# Patient Record
Sex: MALE | Race: OTHER | HISPANIC OR LATINO | Employment: FULL TIME | URBAN - METROPOLITAN AREA
[De-identification: names, ages, dates, MRNs, and addresses within clinical notes are randomized per-mention and may not be internally consistent; named-entity substitution may affect disease eponyms.]

---

## 2019-01-19 ENCOUNTER — HOSPITAL ENCOUNTER (EMERGENCY)
Age: 39
Discharge: HOME | End: 2019-01-19
Attending: EMERGENCY MEDICINE
Payer: COMMERCIAL

## 2019-01-19 VITALS
DIASTOLIC BLOOD PRESSURE: 75 MMHG | OXYGEN SATURATION: 100 % | BODY MASS INDEX: 28.68 KG/M2 | HEART RATE: 61 BPM | WEIGHT: 168 LBS | SYSTOLIC BLOOD PRESSURE: 137 MMHG | RESPIRATION RATE: 20 BRPM | TEMPERATURE: 99.2 F | HEIGHT: 64 IN

## 2019-01-19 DIAGNOSIS — R05.9 COUGH: ICD-10-CM

## 2019-01-19 DIAGNOSIS — J34.89 RHINORRHEA: ICD-10-CM

## 2019-01-19 DIAGNOSIS — R52 GENERALIZED BODY ACHES: Primary | ICD-10-CM

## 2019-01-19 DIAGNOSIS — R51.9 NONINTRACTABLE HEADACHE, UNSPECIFIED CHRONICITY PATTERN, UNSPECIFIED HEADACHE TYPE: ICD-10-CM

## 2019-01-19 DIAGNOSIS — R11.10 VOMITING IN ADULT: ICD-10-CM

## 2019-01-19 DIAGNOSIS — R68.89 FLU-LIKE SYMPTOMS: ICD-10-CM

## 2019-01-19 PROCEDURE — 99283 EMERGENCY DEPT VISIT LOW MDM: CPT

## 2019-01-19 PROCEDURE — 74011250637 HC RX REV CODE- 250/637: Performed by: PHYSICIAN ASSISTANT

## 2019-01-19 RX ORDER — PROMETHAZINE HYDROCHLORIDE 25 MG/1
25 TABLET ORAL
Qty: 12 TAB | Refills: 0 | Status: SHIPPED | OUTPATIENT
Start: 2019-01-19 | End: 2021-04-09

## 2019-01-19 RX ORDER — IBUPROFEN 800 MG/1
800 TABLET ORAL
Status: COMPLETED | OUTPATIENT
Start: 2019-01-19 | End: 2019-01-19

## 2019-01-19 RX ORDER — IBUPROFEN 800 MG/1
800 TABLET ORAL
Qty: 20 TAB | Refills: 0 | Status: SHIPPED | OUTPATIENT
Start: 2019-01-19 | End: 2019-01-26

## 2019-01-19 RX ADMIN — IBUPROFEN 800 MG: 800 TABLET ORAL at 10:15

## 2019-01-19 NOTE — ED TRIAGE NOTES
Generalized body aches, fever and head congestion with a headache. 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

## 2019-01-19 NOTE — DISCHARGE INSTRUCTIONS
Rest, push clear liquids, salt water nose sprays, salt water gargles. Motrin and tylenol for pain and fever. Tos: Instrucciones de cuidado - [ Cough: Care Instructions ]  Instrucciones de cuidado    La tos es [place] de jauregui cuerpo a algo que molesta en la garganta o las vías respiratorias. La tos puede ser provocada por muchas cosas. Usted podría toser debido a un resfriado o manjit gripe, manjit bronquitis o el asma. Fumar, el goteo posnasal, las alergias y el ácido estomacal que regresa a jauregui garganta también pueden causar tos. La tos es un síntoma, no manjit enfermedad. En la [employer], la tos cesa cuando desaparece la causa, fahad un resfriado. Puede conrad algunas medidas en jauregui hogar para toser menos y sentirse mejor. La atención de seguimiento es manjit parte clave de jauregui tratamiento y seguridad. Asegúrese de hacer y acudir a todas las citas, y llame a jauregui médico si está teniendo problemas. También es manjit buena idea saber los resultados de tatiana exámenes y mantener amnjit lista de los medicamentos que germain. ¿Cómo puede cuidarse en el hogar? · Nellie abundante agua y otros líquidos. Tazlina ayuda a [employer] mucosidad sea menos espesa y [place] la garganta seca o adolorida. La miel o el jugo de austyn en [place] o té podrían aliviar manjit tos seca. · [employer] medicamentos para la tos según las indicaciones de jauregui médico.  · Eleve la megan sobre almohadas para ayudarle a respirar y aliviar la tos seca. · Pruebe pastillas para la tos para aliviar la garganta seca o adolorida. Las pastillas para la tos no detienen la tos. Las pastillas para la tos medicinales saborizadas no son mejores que las pastillas con sabor a cuate o los caramelos duros. · No fume. Evite el humo de tabaco ambiental. Si necesita ayuda para dejar de fumar, hable con jauregui médico sobre programas y medicamentos para dejar de fumar. Estos pueden aumentar tatiana probabilidades de dejar el hábito para siempre. ¿Cuándo debe pedir ayuda?   Llame al 911 en cualquier momento que considere que necesita atención de [place]. Por ejemplo, llame si:    · Tiene graves dificultades para respirar.    Llame a jauregui médico ahora mismo o busque atención médica inmediata si:    · Tose philly.     · Tiene nuevas dificultades para respirar o estas empeoran.     · Tiene fiebre nueva o más mica.     · Tiene un salpullido nuevo.    Preste especial atención a los cambios en jauregui pillo y asegúrese de comunicarse con jauregui médico si:    · Jauregui tos es más profunda o más frecuente que antes, especialmente si nota más mucosidad o un cambio en el color de la mucosidad.     · Tiene nuevos síntomas, fahad dolor de garganta, dolor de oídos o dolor en los senos paranasales.     · No mejora fahad se esperaba. ¿Dónde puede encontrar más información en inglés? Jeri Tinoco a http://sienna-[email or web address].info/. Escriba D279 en la búsqueda para aprender más acerca de \"Tos: Instrucciones de cuidado - [ Cough: Care Instructions ]. \"  Revisado: 5 septiembre, 2018  Versión del contenido: 11.9  © 0378-3194 Healthwise, Incorporated. Las instrucciones de cuidado fueron adaptadas bajo licencia por Good [employer] Connections (which disclaims liability or warranty for this information). Si usted tiene [place] afección médica o sobre estas instrucciones, siempre pregunte a jauregui profesional de pillo. Healthwise, Incorporated niega toda garantía o responsabilidad por jauregui uso de esta información. Patient Education        Dolor de Verneta Stair: Instrucciones de cuidado - [ Headache: Care Instructions ]  Instrucciones de cuidado    Los lamont de megan tienen muchas causas posibles. La mayoría de los lamont de megan no son señal de un problema más florina y mejoran por sí solos. El tratamiento en el hogar podría ayudarlo a sentirse mejor con Lien Dieter. El médico lo newman revisado minuciosamente, pallavi puede desarrollar problemas más tarde.  Si nota algún problema o síntomas, busque tratamiento médico inmediatamente. La atención de seguimiento es manjit parte clave de jauregui tratamiento y seguridad. Asegúrese de hacer y acudir a todas las citas, y llame a jauregui médico si está teniendo problemas. También es manjit buena idea saber los resultados de tatiana exámenes y mantener manjit lista de los medicamentos que germain. ¿Cómo puede cuidarse en el hogar? · No conduzca si ha tomado analgésicos (medicamentos para el dolor) recetados. · Descanse en un cuarto tranquilo y oscuro hasta que desaparezca el dolor de [place]. Cierre los ojos y trate de relajarse o dormirse. No tatianna la televisión ni francesca. · Colóquese un paño frío y húmedo o [place] compresa fría sobre la steven adolorida de 10 a 21 minutos cada vez. Póngase un paño lopez entre la compresa fría y la piel. · Utilice manjit toalla húmeda tibia o manjit almohadilla térmica ajustada a baja temperatura para relajar los músculos tensos del andrés y los hombros.  · [place] a alguien que le farida masajes suaves en el andrés y los hombros.  · [place] los analgésicos exactamente fahad le fueron indicados. ? Si el médico le recetó un analgésico, tómelo según las indicaciones. ? Si no está tomando un analgésico recetado, pregúntele a jauregui médico si puede conrad ronak de [employer]. · Tenga cuidado de no conrad analgésicos con mayor frecuencia que la permitida en las indicaciones porque los lamont de megan podrían empeorar o aparecer con mayor frecuencia manjit vez que el medicamento pierda jauregui [place]. · Preste atención a los nuevos síntomas que aparecen con el dolor de [place], [place], debilidad o entumecimiento, cambios en la visión o confusión. Podrían ser señales de un problema más grave. Para prevenir los lamont de megan  · [employer] un diario de tatiana lamont de megan para que pueda averiguar qué los desencadena. Evitar los desencadenantes podría ayudar a prevenir los lamont de [place]. Anote cuándo empieza cada dolor de [place], cuánto dura y cómo es el dolor (palpitante, brandon, punzante o sordo). Anote cualquier otro síntoma que haya tenido con el dolor de [place], [place] náuseas, destellos de cristina o ADALBERTO, o sensibilidad a la cristina brillante o a los ruidos rupert. Anote si el dolor de megan ocurrió cerca de jauregui menstruación. Enumere todos los factores que pudieran tamra desencadenado el dolor de [place], fahad ciertos alimentos (chocolate, queso, vino) u olores, humo, luces brillantes, estrés o falta de sueño. · Encuentre maneras saludables de [facility]. Los lamont de [place] son más comunes sean o isai después de un momento estresante. Tómese un tiempo para relajarse antes y después de hacer algo que le haya causado un dolor de megan en el pasado. · Trate de mantener liliam músculos relajados mediante manjit buena postura. Revise si tiene [employer] de la Danielle, la alma, el andrés y los hombros y trate de relajarlos. Cuando se siente en un escritorio, cambie de posición con frecuencia y estírese por 27 segundos cada hora. · Baldomero suficiente ejercicio y duerma bastante. · Coma en forma regular y delmy. Largos períodos sin comer pueden provocar un dolor de megan. · Regálese un masaje. Algunas personas encuentran que los masajes hechos con regularidad son [place] para aliviar la tensión. · Limite la cafeína. No rossy demasiado café, té ni sodas. Amee no deje de consumir cafeína de repente, porque eso también puede provocarle lamont de [place]. · Reduzca la tensión en los ojos a causa de la computadora parpadeando con frecuencia y apartando la mirada de la pantalla a menudo. Asegúrese de tener lentes adecuados y de que jauregui monitor esté colocado de manera correcta, fahad a un brazo de distancia. · Busque ayuda si tiene depresión o ansiedad. Liliam lamont de [place] podrían relacionarse con estas afecciones. El tratamiento puede prevenir los lamont de [place] y ayudar con los síntomas de ansiedad o depresión. ¿Cuándo debe pedir ayuda?   Llame al 911 en cualquier momento que chucke que puede necesitar atención de emergencia. Por ejemplo, llame si:    · Tiene señales de un ataque cerebral. Estas pueden incluir:  ? Parálisis, entumecimiento o debilidad repentinos en la alma, el brazo o la pierna, sobre todo si ocurre en un solo lado del cuerpo. ? Cambios repentinos en la visión. ? Dificultades repentinas para hablar. ? Confusión repentina o dificultad para comprender frases sencillas. ? Problemas repentinos para caminar o mantener el equilibrio. ? Dolor de [place] intenso y repentino, distinto de los lamont de megan anteriores.    Llame a jauregui médico ahora mismo o busque atención médica inmediata si:    · Tiene un dolor de Matt Caper o peor.     · Jauregui dolor de megan empeora mucho. ¿Dónde puede encontrar más información en inglés? Julia Marker a http://sienna-[email or web address].info/. Escriba L320 en la búsqueda para aprender más acerca de \"Dolor de megan: Instrucciones de cuidado - [ Headache: Care Instructions ]. \"  Revisado: 3 danna, 2018  Versión del contenido: 11.9  © 3955-5538 Healthwise, Incorporated. Las instrucciones de cuidado fueron adaptadas bajo licencia por Good Help Connections (which disclaims liability or warranty for this information). Si usted tiene [place] afección médica o sobre estas instrucciones, siempre pregunte a jauregui profesional de pillo. Healthwise, Incorporated niega toda garantía o responsabilidad por jauregui uso de esta información. Patient Education        Náuseas y vómito: Instrucciones de cuidado - [ Nausea and Vomiting: Care Instructions ]  Instrucciones de cuidado    Cuando tiene náuseas, podría sentirse débil y sudoroso y notar mucha saliva en jauregui boca. Las náuseas suelen [employer]. La mayoría de las veces no hay que preocuparse por las náuseas y [facility], pallavi estos pueden ser signos de [facility]. Dos causas comunes de náuseas y vómito son la gastroenteritis viral y la intoxicación por alimentos.  Las náuseas y [facility] vómito por gastroenteritis viral, por lo general, empiezan a mejorar en unas 24 horas. Las náuseas y el vómito debido a intoxicación por alimentos podrían durar de 12 a 48 horas. El médico lo ha revisado minuciosamente, pallavi puede desarrollar problemas más tarde. Si nota algún problema o síntomas nuevos, busque tratamiento médico inmediatamente. La atención de seguimiento es manjit parte clave de jauregui tratamiento y seguridad. Asegúrese de hacer y acudir a todas las citas, y llame a jauregui médico si está teniendo problemas. También es manjit buena idea saber los resultados de tatiana exámenes y mantener manjit lista de los medicamentos que germain. ¿Cómo puede cuidarse en el hogar? · Para prevenir la deshidratación, rossy abundantes líquidos, suficientes para que jauregui orina sea de color amarillo gloria o amilcar fahad el agua. Opte por beber agua y otros líquidos maliha sin cafeína hasta que se sienta mejor. Si tiene manjit enfermedad del riñón, del corazón o del hígado y tiene que [place]'s líquidos, hable con jauregui médico antes de aumentar jauregui consumo. · Permanezca en la cama hasta que se sienta mejor. · Cuando pueda comer, empiece a consumir sopas claras (caldos), alimentos suaves y líquidos hasta que todos los síntomas hayan desaparecido por un período de 12 a 48 horas. Otras elecciones buenas incluyen pan giovana seco, galletas saladas, cereal cocido y postre de gelatina, fahad Jell-O.  ¿Cuándo debe pedir ayuda? Llame al 911 toda vez que piense que puede necesitar atención de emergencia. Por ejemplo, llame si:    · Se desmayó (perdió el conocimiento).    Llame a jauregui médico ahora mismo o busque atención médica inmediata si:    · Tiene síntomas de deshidratación, tales fahad:  ? Ojos secos y boca seca. ? Orina solo poca cantidad de color oscuro. ?  Tiene más sed de lo normal.     · Tiene dolor abdominal nuevo o que empeora.     · Tiene fiebre nueva o que [place].     · Vomita philly o algo parecido a granos de café molido.    Preste especial atención a los [employer] jauregui pillo y asegúrese de comunicarse con jauregui médico si:    · Tiene náusea y vómito continuos.     · El vómito está empeorando.     · El vómito dura más de 2 días.     · No mejora fahad se esperaba. ¿Dónde puede encontrar más información en inglés? Edna End a http://sienna-dawson.info/. Maria T Gucci H591 en la búsqueda para aprender más acerca de \"Náuseas y vómito: Instrucciones de cuidado - [ Nausea and Vomiting: Care Instructions ]. \"  Revisado: 23 septiembre, 2018  Versión del contenido: 11.9  © 2598-8156 Healthwise, Incorporated. Las instrucciones de cuidado fueron adaptadas bajo licencia por Good [employer] Connections (which disclaims liability or warranty for this information). Si usted tiene [place] afección médica o sobre estas instrucciones, siempre pregunte a jauregui profesional de pillo. Healthwise, Incorporated niega toda garantía o responsabilidad por jauregui uso de esta información. Patient Education        Influenza (gripe): Instrucciones de cuidado - [ Influenza (Flu): Care Instructions ]  Instrucciones de cuidado    La influenza (gripe) es manjit infección de los pulmones y las vías respiratorias. Es causada por el virus de la influenza. Hay diferentes cepas o tipos de virus de la gripe de un año a otro. A diferencia del resfriado común, la gripe se presenta de [place] repentina y [address], tales fahad tos, [place], [place], escalofríos, fatiga y [place], son más intensos. Estos síntomas pueden durar hasta 10 días. Aunque la gripe puede hacerle sentirse muy enfermo, por lo general no causa problemas serios de pillo. Por lo general, todo lo que necesita para los síntomas de la gripe es tratamiento en casa. Amee jauregui médico podría recetarle algún medicamento antiviral para prevenir otros problemas de pillo, fahad la neumonía.  Las personas mayores y quienes tienen un problema de pillo prolongado, fahad manjit enfermedad pulmonar, tienen el mayor riesgo de desarrollar neumonía u otros problemas de pillo. La atención de seguimiento es manjit parte clave de jauregui tratamiento y seguridad. Asegúrese de hacer y acudir a todas las citas, y llame a jauregui médico si está teniendo problemas. También es manjit buena idea saber los resultados de tatiana exámenes y mantener manjit lista de los medicamentos que germain. ¿Cómo puede cuidarse en el hogar? · Descanse bastante. · Nellie abundantes líquidos, suficientes para que jauregui orina sea de color amarillo gloria o transparente fahad el agua. Si tiene manjit enfermedad del riñón, del corazón o del hígado y tiene que L[place]'s líquidos, hable con jauregui médico antes de aumentar jauregui consumo. · Si es necesario, tome un analgésico (medicamento para el dolor) de venta connie, fahad acetaminofén (Tylenol), ibuprofeno (Advil, Motrin) o naproxeno (Aleve), para [employer] fiebre, el dolor de [place] y los lamont musculares. Soraida y siga todas las instrucciones de la [place]. Ninguna persona cecy de 20 años debe conrad aspirina. Ésta ha sido relacionada con el síndrome de [place], manjit enfermedad grave. · No fume. Fumar puede empeorar la gripe. Si necesita ayuda para dejar de fumar, hable con jauregui médico [employer] y medicamentos para dejar de fumar. Éstos pueden aumentar tatiana probabilidades de dejar el hábito para siempre. · Para ayudar a despejar la nariz congestionada, respire aire húmedo de [place] caliente o un lavabo lleno de [place]. · Antes de usar medicamentos para la tos y los resfriados, revise la [place]. Estos medicamentos podrían no ser seguros para los niños pequeños o las personas con ciertos problemas de [place]. · Si le duele la piel alrededor de la nariz y los labios, aplique un poco de vaselina en la steven. · Para aliviar la tos:  ? Nellie líquidos para aliviar la comezón de garganta. ? Chupe pastillas para la tos o caramelos duros comunes. ? [employer] para la tos de venta connie que contenga dextrometorfano para ayudarle a dormir.  Soraida y [id number] [place] todas las instrucciones de la etiqueta. ? Use manjit almohada extra en la noche para levantar más la megan. Kulm podría ayudarlo a descansar si la tos lo mantiene despierto. · [employer] medicamentos recetados exactamente fahad le fueron recetados. Llame a jauregui médico si adelina estar teniendo problemas con jauregui medicamento. Cómo evitar propagar la gripe  · [employer] karuna con regularidad y manténgalas alejadas de jauregui alma. · No vaya a la escuela, al Charley Cresco o a otros lugares públicos hasta que se sienta mejor y jauregui fiebre haya desaparecido por al menos 24 horas. La fiebre debe tamra desaparecido por sí misma, sin la ayuda de medicamentos. · Pida a las personas que viven con usted que hablen con tatiana médicos sobre la prevención de la gripe. Cristhian vez les den algún medicamento antiviral para no contraer jauregui gripe. · Para prevenir tener la gripe en el futuro, hágase poner la vacuna contra la gripe cada otoño. Anime a las personas que viven en jauregui casa a ponerse la vacuna. · Cúbrase la boca al toser o estornudar. ¿Cuándo debe pedir ayuda? Llame al 911 en cualquier momento que considere que necesita atención de emergencia. Por ejemplo, llame si:    · Tiene serias dificultades para respirar.    Llame a jauregui médico ahora mismo o busque atención médica inmediata si:    · Tiene nueva o mayor dificultad para respirar.     · Le parece que está mucho más enfermo.     · Se siente muy somnoliento (con sueño) o confuso.     · Tiene fiebre nueva o más mica.     · Tiene un salpullido nuevo.    Preste especial atención a los cambios en jauregui pillo y asegúrese de comunicarse con jauregui médico si:    · Minetta Bone a mejorar y después empeora otra vez.     · No está mejorando después de 1 semana. ¿Dónde puede encontrar más información en inglés? Gerri Mike a http://sienna-dawson.info/. Roberta Vicente U321 en la búsqueda para aprender más acerca de \"Influenza (gripe): Instrucciones de cuidado - [ Influenza (Flu): Care Instructions ]. \"  Revisado: 5 septiembre, 2018  Versión del contenido: 11.9  © 6276-4919 Healthwise, Incorporated. Las instrucciones de cuidado fueron adaptadas bajo licencia por Good Help Connections (which disclaims liability or warranty for this information). Si usted tiene [place] afección médica o sobre estas instrucciones, siempre pregunte a jauregui profesional de pillo. Healthwise, Incorporated niega toda garantía o responsabilidad por jauregui uso de esta información. Infecciones virales: Instrucciones de cuidado - [ Viral Infections: Care Instructions ]  Instrucciones de cuidado    Usted no se siente delmy, amee no está gloria qué lo está causando. Podría tener manjit infección viral. Los virus provocan muchas enfermedades, fahad el resfriado común, influenza, fiebre, salpullidos, y la diarrea, las náuseas y el vómito que suelen llamarse \"gastroenteritis viral\". Es posible que se pregunte si los medicamentos antibióticos pueden ayudarle a sentirse mejor. Amee los antibióticos tratan únicamente infecciones causadas por bacterias. No funcionan para los virus. La buena noticia es que las infecciones virales generalmente no son graves. La mayoría desaparecen en unos pocos días sin tratamiento médico. Mientras tanto, hay algunas cosas que usted puede hacer para estar más cómodo. La atención de seguimiento es manjit parte clave de jauregui tratamiento y seguridad. Asegúrese de hacer y acudir a todas las citas, y llame a jauregui médico si está teniendo problemas. También es manjit buena idea saber los resultados de tatiana exámenes y mantener manjit lista de los medicamentos que germain. ¿Cómo puede cuidarse en el hogar? · Descanse lo suficiente si se siente agotado. · [place] un analgésico (medicamento para el dolor) de venta connie, fahad acetaminofén (Tylenol), ibuprofeno (Advil, Motrin) o naproxeno (Aleve), si es necesario. Soraida y siga todas las instrucciones de la [place].   · Tenga cuidado cuando tome medicamentos de venta connie para el resfriado o la gripe y Tylenol al mismo tiempo. Muchos de estos medicamentos contienen acetaminofén, o sea, Tylenol. Soraida las etiquetas para asegurarse de que no esté tomando manjit dosis mayor que la recomendada. El exceso de acetaminofén (Tylenol) puede ser dañino. · Nellie abundantes líquidos, suficientes para que jauregui orina sea de color amarillo gloria o amilcar fahad el agua. Si tiene manjit enfermedad del riñón, del corazón o del hígado y tiene que [place]'s líquidos, hable con jauregui médico antes de aumentar jauregui consumo. · Cuando tenga fiebre, no vaya al [facility], a la escuela ni a otros lugares públicos. ¿Cuándo debe pedir ayuda? Llame al 911 en cualquier momento que considere que necesita atención de emergencia. Por ejemplo, llame si:    · Tiene dificultad grave para respirar.     · Se desmayó (perdió el conocimiento).    Llame a jauregui médico ahora mismo o busque atención médica inmediata si:    · Le parece que está mucho más enfermo.     · Tiene fiebre nueva o más mica.     · Tiene philly en las heces.     · Tiene dolor de estómago nuevo o que [facility].     · Tiene un nuevo salpullido.    Preste especial atención a los cambios en jauregui pillo y asegúrese de comunicarse con jauregui médico si:    · Nicholos Blaze a mejorar y luego empeora.     · No mejora fahad se esperaba. ¿Dónde puede encontrar más información en inglés? Shelbie Ferrer a http://sienna-dawson.info/. Alee Jose U043 en la búsqueda para aprender más acerca de \"Infecciones virales: Instrucciones de cuidado - [ Viral Infections: Care Instructions ]. \"  Revisado: 30 julio, 2018  Versión del contenido: 11.9  © 8606-0468 Healthwise, Incorporated. Las instrucciones de cuidado fueron adaptadas bajo licencia por Good Help Connections (which disclaims liability or warranty for this information). Si usted tiene [place] afección médica o sobre estas instrucciones, siempre pregunte a jauregui profesional de pillo.  Healthwise, Incorporated niega toda garantía o responsabilidad por jauregui uso de esta información.

## 2019-01-19 NOTE — LETTER
[address] 
[facility] EMERGENCY DEPT 
[address] [facility] [address] [facility] [address] [place] [address] [id number] [phone] Work/School Note Date: 1/19/2019 To Whom It May concern: 
 
Cris Ashley was seen and treated today in the emergency room by the following provider(s): 
Attending Provider: Bruno Reid MD 
Physician Assistant: CRISTIANA Brito. Cris Ashley may return to work on 1/22/19.  
 
Sincerely, 
 
 
 
 
CRISTIANA Roberto

## 2021-04-09 ENCOUNTER — OFFICE VISIT (OUTPATIENT)
Dept: URGENT CARE | Age: 41
End: 2021-04-09
Payer: COMMERCIAL

## 2021-04-09 VITALS — RESPIRATION RATE: 17 BRPM | TEMPERATURE: 97.8 F | HEART RATE: 64 BPM | OXYGEN SATURATION: 100 %

## 2021-04-09 DIAGNOSIS — Z20.822 EXPOSURE TO COVID-19 VIRUS: Primary | ICD-10-CM

## 2021-04-09 LAB — SARS-COV-2 POC: NEGATIVE

## 2021-04-09 PROCEDURE — 99202 OFFICE O/P NEW SF 15 MIN: CPT | Performed by: NURSE PRACTITIONER

## 2021-04-09 PROCEDURE — 87426 SARSCOV CORONAVIRUS AG IA: CPT | Performed by: NURSE PRACTITIONER

## 2021-04-09 NOTE — PROGRESS NOTES
This patient was seen at [address] [place] [address] Urgent Care while in their vehicle due to COVID-19 pandemic with PPE and focused examination in order to decrease community viral transmission. The patient/guardian gave verbal consent to treat. Heather Brito is a 47 y.o. male who presents for COVID-19 testing. Was exposed to COVID-19 by co-worker this past week. Denies any symptoms such as cough, SOB, chest tightness, congestion, ST, HA, n/v/d, fever etc. No known exposure to COVID or sick contacts. No other complaints or concerns at this time. PMH: None. Non-smoker. History reviewed. No pertinent past medical history. History reviewed. No pertinent surgical history. History reviewed. No pertinent family history.      Social History     Socioeconomic History    Marital status:      Spouse name: Not on file    Number of children: Not on file    Years of education: Not on file    Highest education level: Not on file   Occupational History    Not on file   Social Needs    Financial resource strain: Not on file    Food insecurity     Worry: Not on file     Inability: Not on file    Transportation needs     Medical: Not on file     Non-medical: Not on file   Tobacco Use    Smoking status: Never Smoker    Smokeless tobacco: Never Used   Substance and Sexual Activity    Alcohol use: Not on file    Drug use: Not on file    Sexual activity: Not on file   Lifestyle    Physical activity     Days per week: Not on file     Minutes per session: Not on file    Stress: Not on file   Relationships    Social connections     Talks on phone: Not on file     Gets together: Not on file     Attends [employer] service: Not on file     Active member of club or organization: Not on file     Attends meetings of clubs or organizations: Not on file     Relationship status: Not on file    Intimate partner violence     Fear of current or ex partner: Not on file     Emotionally abused: Not on file Physically abused: Not on file     Forced sexual activity: Not on file   Other Topics Concern    Not on file   Social History Narrative    Not on file                ALLERGIES: Patient has no known allergies. Review of Systems   Constitutional: Negative for activity change, appetite change, chills, diaphoresis, fatigue and fever. HENT: Negative for congestion, ear pain, postnasal drip, rhinorrhea, sinus pressure, sinus pain and sore throat. Respiratory: Negative for cough, chest tightness, shortness of breath and wheezing. Cardiovascular: Negative for chest pain. Gastrointestinal: Negative for abdominal pain, diarrhea, nausea and vomiting. Musculoskeletal: Negative for myalgias. Skin: Negative for rash. Neurological: Negative for dizziness, light-headedness and headaches. Vitals:    04/09/21 0906   Pulse: 64   Resp: 17   Temp: 97.8 °F (36.6 °C)   SpO2: 100%       Physical Exam  Vitals signs and nursing note reviewed. Constitutional:       General: He is not in acute distress. Appearance: Normal appearance. He is not ill-appearing. HENT:      Head: Normocephalic and atraumatic. Eyes:      Conjunctiva/sclera: Conjunctivae normal.      Pupils: Pupils are equal, round, and reactive to light. Neck:      Musculoskeletal: Normal range of motion and neck supple. Cardiovascular:      Rate and Rhythm: Normal rate. Pulmonary:      Effort: Pulmonary effort is normal.   Musculoskeletal: Normal range of motion. Skin:     General: Skin is warm and dry. Findings: No rash. Neurological:      Mental Status: He is alert and oriented to person, place, and time. Psychiatric:         Mood and Affect: Mood normal.         Thought Content: Thought content normal.         MDM    Procedures      ICD-10-CM ICD-9-CM   1.  Exposure to COVID-19 virus  Z20.822 V01.79       Orders Placed This Encounter    NOVEL CORONAVIRUS (COVID-19) ([facility] Default)     Scheduling Instructions:      1) Due to current limited availability of the COVID-19 PCR test, tests will be prioritized and may not be completed.              2) Order only if the test result will change clinical management or necessary for a return to mission-critical employment decision.              3) Print and instruct patient to adhere to CDC home isolation program. (Link Above)              4) Set up or refer patient for a monitoring program.              5) Have patient sign up for and leverage MyChart (if not previously done). Order Specific Question:   Is this test for diagnosis or screening? Answer:   Screening     Order Specific Question:   Symptomatic for COVID-19 as defined by CDC? Answer:   No     Order Specific Question:   Hospitalized for COVID-19? Answer:   No     Order Specific Question:   Admitted to ICU for COVID-19? Answer:   No     Order Specific Question:   Employed in healthcare setting? Answer:   Unknown     Order Specific Question:   Resident in a congregate (group) care setting? Answer:   Unknown     Order Specific Question:   Previously tested for COVID-19? Answer:   Unknown    AMB POC SARS-COV-2     Order Specific Question:   Is this test for diagnosis or screening? Answer:   Screening     Order Specific Question:   Symptomatic for COVID-19 as defined by CDC? Answer:   No     Order Specific Question:   Hospitalized for COVID-19? Answer:   No     Order Specific Question:   Admitted to ICU for COVID-19? Answer:   No     Order Specific Question:   Employed in healthcare setting? Answer:   Unknown     Order Specific Question:   Resident in a congregate (group) care setting? Answer:   Unknown     Order Specific Question:   Previously tested for COVID-19?      Answer:   Unknown      Results for orders placed or performed in visit on 04/09/21   AMB POC SARS-COV-2   Result Value Ref Range    SARS-COV-2 POC Negative Negative       Quarantine recommendations reviewed per [facility] guidelines. Encouraged deep breathing exercises, ambulation, Tylenol prn- should symptoms develop. Increase fluids with electrolytes    The patient is to follow up with PCP PRN. If signs and symptoms become worse the pt is to go to the ER.      Signed By: Uday Marie NP     April 9, 2021

## 2021-04-11 LAB
SARS-COV-2, NAA 2 DAY TAT: NORMAL
SARS-COV-2, NAA: NOT DETECTED

## 2021-04-12 ENCOUNTER — OFFICE VISIT (OUTPATIENT)
Dept: URGENT CARE | Age: 41
End: 2021-04-12
Payer: COMMERCIAL

## 2021-04-12 VITALS — OXYGEN SATURATION: 97 % | RESPIRATION RATE: 15 BRPM | HEART RATE: 67 BPM | TEMPERATURE: 99.1 F

## 2021-04-12 DIAGNOSIS — Z20.822 EXPOSURE TO COVID-19 VIRUS: Primary | ICD-10-CM

## 2021-04-12 LAB — SARS-COV-2 POC: NEGATIVE

## 2021-04-12 PROCEDURE — 99213 OFFICE O/P EST LOW 20 MIN: CPT | Performed by: NURSE PRACTITIONER

## 2021-04-12 PROCEDURE — 87426 SARSCOV CORONAVIRUS AG IA: CPT | Performed by: NURSE PRACTITIONER

## 2021-04-12 NOTE — PROGRESS NOTES
This patient was seen at [address] [place] [address] Urgent Care while in their vehicle due to COVID-19 pandemic with PPE and focused examination in order to decrease community viral transmission. The patient/guardian gave verbal consent to treat. Michele Zuleta is a 47 y.o. male who presents for COVID-19 testing. Was exposed to COVID-19 by co-worker last week. Denies any symptoms such as cough, SOB, chest tightness, congestion, ST, HA, n/v/d, fever etc. Patient had negative rapid and PCR COVID test about 4 days ago. No other complaints or concerns at this time. PMH: None. Non-smoker. History reviewed. No pertinent past medical history. History reviewed. No pertinent surgical history. History reviewed. No pertinent family history.      Social History     Socioeconomic History    Marital status:      Spouse name: Not on file    Number of children: Not on file    Years of education: Not on file    Highest education level: Not on file   Occupational History    Not on file   Social Needs    Financial resource strain: Not on file    Food insecurity     Worry: Not on file     Inability: Not on file    Transportation needs     Medical: Not on file     Non-medical: Not on file   Tobacco Use    Smoking status: Never Smoker    Smokeless tobacco: Never Used   Substance and Sexual Activity    Alcohol use: Not on file    Drug use: Not on file    Sexual activity: Not on file   Lifestyle    Physical activity     Days per week: Not on file     Minutes per session: Not on file    Stress: Not on file   Relationships    Social connections     Talks on phone: Not on file     Gets together: Not on file     Attends Congregational service: Not on file     Active member of club or organization: Not on file     Attends meetings of clubs or organizations: Not on file     Relationship status: Not on file    Intimate partner violence     Fear of current or ex partner: Not on file     Emotionally abused: Not on file     Physically abused: Not on file     Forced sexual activity: Not on file   Other Topics Concern    Not on file   Social History Narrative    Not on file                ALLERGIES: Patient has no known allergies. Review of Systems   Constitutional: Negative for activity change, appetite change, chills, diaphoresis, fatigue and fever. HENT: Negative for congestion, ear pain, postnasal drip, rhinorrhea, sinus pressure, sinus pain and sore throat. Respiratory: Negative for cough, chest tightness, shortness of breath and wheezing. Cardiovascular: Negative for chest pain. Gastrointestinal: Negative for abdominal pain, diarrhea, nausea and vomiting. Musculoskeletal: Negative for myalgias. Skin: Negative for rash. Neurological: Negative for dizziness, light-headedness and headaches. Vitals:    04/12/21 1230   Pulse: 67   Resp: 15   Temp: 99.1 °F (37.3 °C)   SpO2: 97%       Physical Exam  Vitals signs and nursing note reviewed. Constitutional:       General: He is not in acute distress. Appearance: Normal appearance. He is not ill-appearing. HENT:      Head: Normocephalic and atraumatic. Eyes:      Conjunctiva/sclera: Conjunctivae normal.      Pupils: Pupils are equal, round, and reactive to light. Neck:      Musculoskeletal: Normal range of motion and neck supple. Cardiovascular:      Rate and Rhythm: Normal rate. Pulmonary:      Effort: Pulmonary effort is normal.   Musculoskeletal: Normal range of motion. Skin:     General: Skin is warm and dry. Findings: No rash. Neurological:      Mental Status: He is alert and oriented to person, place, and time. Psychiatric:         Mood and Affect: Mood normal.         Thought Content: Thought content normal.         MDM    Procedures        ICD-10-CM ICD-9-CM   1.  Exposure to COVID-19 virus  Z20.822 V01.79       Orders Placed This Encounter    NOVEL CORONAVIRUS (COVID-19) ([facility] Default)     Scheduling Instructions: 1) Due to current limited availability of the COVID-19 PCR test, tests will be prioritized and may not be completed.              2) Order only if the test result will change clinical management or necessary for a return to mission-critical employment decision.              3) Print and instruct patient to adhere to CDC home isolation program. (Link Above)              4) Set up or refer patient for a monitoring program.              5) Have patient sign up for and leverage MyChart (if not previously done). Order Specific Question:   Is this test for diagnosis or screening? Answer:   Screening     Order Specific Question:   Symptomatic for COVID-19 as defined by CDC? Answer:   No     Order Specific Question:   Hospitalized for COVID-19? Answer:   No     Order Specific Question:   Admitted to ICU for COVID-19? Answer:   No     Order Specific Question:   Employed in healthcare setting? Answer:   Unknown     Order Specific Question:   Resident in a congregate (group) care setting? Answer:   Unknown     Order Specific Question:   Previously tested for COVID-19? Answer: Yes    AMB POC SARS-COV-2     Order Specific Question:   Is this test for diagnosis or screening? Answer:   Screening     Order Specific Question:   Symptomatic for COVID-19 as defined by CDC? Answer:   No     Order Specific Question:   Hospitalized for COVID-19? Answer:   No     Order Specific Question:   Admitted to ICU for COVID-19? Answer:   No     Order Specific Question:   Employed in healthcare setting? Answer:   Unknown     Order Specific Question:   Resident in a congregate (group) care setting? Answer:   Unknown     Order Specific Question:   Previously tested for COVID-19?      Answer:   Yes      Results for orders placed or performed in visit on 04/12/21   AMB POC SARS-COV-2   Result Value Ref Range    SARS-COV-2 POC Negative Negative     Quarantine recommendations reviewed per [facility] guidelines. Encouraged deep breathing exercises, ambulation, Tylenol prn- should symptoms develop. Increase fluids with electrolytes    The patient is to follow up with PCP PRN. If signs and symptoms become worse the pt is to go to the ER.      Signed By: Eduar Edge NP     April 12, 2021

## 2021-04-14 LAB
SARS-COV-2, NAA 2 DAY TAT: NORMAL
SARS-COV-2, NAA: NOT DETECTED

## 2021-04-16 ENCOUNTER — IMMUNIZATION (OUTPATIENT)
Dept: FAMILY MEDICINE CLINIC | Age: 41
End: 2021-04-16

## 2021-04-16 DIAGNOSIS — Z23 ENCOUNTER FOR IMMUNIZATION: Primary | ICD-10-CM

## 2021-04-16 PROCEDURE — 0011A COVID-19, MRNA, LNP-S, PF, 100MCG/0.5ML DOSE(MODERNA): CPT

## 2021-04-16 PROCEDURE — 91301 COVID-19, MRNA, LNP-S, PF, 100MCG/0.5ML DOSE(MODERNA): CPT

## 2021-05-14 ENCOUNTER — IMMUNIZATION (OUTPATIENT)
Dept: FAMILY MEDICINE CLINIC | Age: 41
End: 2021-05-14

## 2021-05-14 DIAGNOSIS — Z23 ENCOUNTER FOR IMMUNIZATION: Primary | ICD-10-CM

## 2021-05-14 PROCEDURE — 0012A COVID-19, MRNA, LNP-S, PF, 100MCG/0.5ML DOSE(MODERNA): CPT

## 2021-05-14 PROCEDURE — 91301 COVID-19, MRNA, LNP-S, PF, 100MCG/0.5ML DOSE(MODERNA): CPT

## 2021-09-04 ENCOUNTER — OFFICE VISIT (OUTPATIENT)
Dept: URGENT CARE | Age: 41
End: 2021-09-04
Payer: COMMERCIAL

## 2021-09-04 VITALS — RESPIRATION RATE: 19 BRPM | TEMPERATURE: 99.3 F | HEART RATE: 67 BPM | OXYGEN SATURATION: 100 %

## 2021-09-04 DIAGNOSIS — Z20.822 ENCOUNTER FOR LABORATORY TESTING FOR COVID-19 VIRUS: Primary | ICD-10-CM

## 2021-09-04 LAB — SARS-COV-2 POC: POSITIVE

## 2021-09-04 PROCEDURE — 87426 SARSCOV CORONAVIRUS AG IA: CPT | Performed by: FAMILY MEDICINE

## 2021-09-04 PROCEDURE — 99213 OFFICE O/P EST LOW 20 MIN: CPT | Performed by: FAMILY MEDICINE

## 2021-09-04 NOTE — PROGRESS NOTES
Patient presents with a request for COVID Testing. He was exposed to a COVID positive person. He is symptomatic with malaise, body aches, headache, fatigue. He has not taken anything for symptoms. He states that they are worse today than they were yesterday. This patient was seen in [facility] at [address] [place] [address] [facility]gent [facility] while in their vehicle due to COVID-19 pandemic with PPE and focused examination in order to decrease community viral transmission. The patient/guardian gave verbal consent to treat. History reviewed. No pertinent past medical history. History reviewed. No pertinent surgical history. History reviewed. No pertinent family history. Social History     Socioeconomic History    Marital status:      Spouse name: Not on file    Number of children: Not on file    Years of education: Not on file    Highest education level: Not on file   Occupational History    Not on file   Tobacco Use    Smoking status: Never Smoker    Smokeless tobacco: Never Used   Substance and Sexual Activity    Alcohol use: Not on file    Drug use: Not on file    Sexual activity: Not on file   Other Topics Concern    Not on file   Social History Narrative    Not on file     Social Determinants of Health     Financial Resource Strain:     Difficulty of Paying Living Expenses:    Food Insecurity:     Worried About Running Out of Food in the Last Year:     [address] in the Last Year:    Transportation Needs:     Lack of Transportation (Medical):      Lack of Transportation (Non-Medical):    Physical Activity:     Days of Exercise per Week:     Minutes of Exercise per Session:    Stress:     Feeling of Stress :    Social Connections:     Frequency of Communication with Friends and Family:     Frequency of Social Gatherings with Friends and Family:     Attends [employer] Services:     Active Member of Clubs or Organizations:     Attends Club or Organization Meetings:     Marital Status:    Intimate Partner Violence:     Fear of Current or Ex-Partner:     Emotionally Abused:     Physically Abused:     Sexually Abused: ALLERGIES: Patient has no known allergies. Review of Systems   Constitutional: Positive for activity change, chills and fatigue. Negative for appetite change and fever. HENT: Negative for congestion, postnasal drip, rhinorrhea, sinus pressure and sinus pain. Respiratory: Negative for cough, chest tightness and shortness of breath. Cardiovascular: Negative for chest pain. Gastrointestinal: Negative for diarrhea, nausea and vomiting. Neurological: Positive for headaches. Vitals:    09/04/21 1530   Pulse: 67   Resp: 19   Temp: 99.3 °F (37.4 °C)   SpO2: 100%       Physical Exam  Constitutional:       General: He is not in acute distress. Appearance: He is well-developed. He is ill-appearing. He is not diaphoretic. HENT:      Nose: Congestion and rhinorrhea present. Cardiovascular:      Rate and Rhythm: Normal rate. Pulmonary:      Effort: Pulmonary effort is normal. No respiratory distress. Neurological:      Mental Status: He is alert. Psychiatric:         Behavior: Behavior is cooperative. MDM    ICD-10-CM ICD-9-CM    1. Encounter for laboratory testing for COVID-19 virus  Z20.822 V01.79 AMB POC SARS-COV-2      NOVEL CORONAVIRUS (COVID-19)     No orders of the defined types were placed in this encounter. No results found for any visits on 09/04/21. The patients condition was discussed with the patient and they understand. The patient is to follow up with primary care doctor. If signs and symptoms become worse the pt is to go to the ER. The patient is to take medications as prescribed.      Procedures

## 2021-09-14 ENCOUNTER — OFFICE VISIT (OUTPATIENT)
Dept: URGENT CARE | Age: 41
End: 2021-09-14
Payer: COMMERCIAL

## 2021-09-14 VITALS — RESPIRATION RATE: 12 BRPM | OXYGEN SATURATION: 98 % | TEMPERATURE: 98.3 F | HEART RATE: 61 BPM

## 2021-09-14 DIAGNOSIS — U07.1 COVID-19: Primary | ICD-10-CM

## 2021-09-14 PROCEDURE — 99212 OFFICE O/P EST SF 10 MIN: CPT | Performed by: FAMILY MEDICINE

## 2021-09-14 NOTE — PROGRESS NOTES
This patient was seen at [address] [place] [address] Urgent Care while in their vehicle due to COVID-19 pandemic with PPE and focused examination in order to decrease community viral transmission. The patient/guardian gave verbal consent to treat. Amado Suazo is a 47 y.o. male who presents for COVID-19 retesting. Tested positive to COVID 10 days ago. Needs retest to return to work. Denies cough, fever, SOB, N/V/D. The history is provided by the patient. History reviewed. No pertinent past medical history. History reviewed. No pertinent surgical history. History reviewed. No pertinent family history. Social History     Socioeconomic History    Marital status:      Spouse name: Not on file    Number of children: Not on file    Years of education: Not on file    Highest education level: Not on file   Occupational History    Not on file   Tobacco Use    Smoking status: Never Smoker    Smokeless tobacco: Never Used   Substance and Sexual Activity    Alcohol use: Not on file    Drug use: Not on file    Sexual activity: Not on file   Other Topics Concern    Not on file   Social History Narrative    Not on file     Social Determinants of Health     Financial Resource Strain:     Difficulty of Paying Living Expenses:    Food Insecurity:     Worried About Running Out of Food in the Last Year:     [address] in the Last Year:    Transportation Needs:     Lack of Transportation (Medical):      Lack of Transportation (Non-Medical):    Physical Activity:     Days of Exercise per Week:     Minutes of Exercise per Session:    Stress:     Feeling of Stress :    Social Connections:     Frequency of Communication with Friends and Family:     Frequency of Social Gatherings with Friends and Family:     Attends [employer] Services:     Active Member of Clubs or Organizations:     Attends Club or Organization Meetings:     Marital Status:    Intimate Partner Violence:  Fear of Current or Ex-Partner:     Emotionally Abused:     Physically Abused:     Sexually Abused: ALLERGIES: Patient has no known allergies. Review of Systems   Constitutional: Negative for fever. Respiratory: Negative for cough and shortness of breath. Gastrointestinal: Negative for diarrhea, nausea and vomiting. Vitals:    09/14/21 0910   Pulse: 61   Resp: 12   Temp: 98.3 °F (36.8 °C)   SpO2: 98%       Physical Exam  Vitals and nursing note reviewed. Constitutional:       General: He is not in acute distress. Appearance: He is well-developed. He is not diaphoretic. Pulmonary:      Effort: Pulmonary effort is normal.   Neurological:      Mental Status: He is alert. Psychiatric:         Behavior: Behavior normal.         Thought Content: Thought content normal.         Judgment: Judgment normal.         [facility]    ICD-10-CM ICD-9-CM   1. COVID-19  U07.1 079.89       Orders Placed This Encounter    NOVEL CORONAVIRUS (COVID-19)     Scheduling Instructions:      1) Due to current limited availability of the COVID-19 PCR test, tests will be prioritized and may not be completed.              2) Order only if the test result will change clinical management or necessary for a return to mission-critical employment decision.              3) Print and instruct patient to adhere to CDC home isolation program. (Link Above)              4) Set up or refer patient for a monitoring program.              5) Have patient sign up for and leverage [place] (if not previously done). Order Specific Question:   Is this test for diagnosis or screening? Answer:   Screening     Order Specific Question:   Symptomatic for COVID-19 as defined by CDC? Answer:   No     Order Specific Question:   Hospitalized for COVID-19? Answer:   No     Order Specific Question:   Admitted to ICU for COVID-19? Answer:   No     Order Specific Question:   Employed in healthcare setting?      Answer: Unknown     Order Specific Question:   Resident in a congregate (group) care setting? Answer:   No     Order Specific Question:   Previously tested for COVID-19? Answer:    Yes     Await COVID results        Procedures

## 2021-09-17 LAB
SARS-COV-2, NAA 2 DAY TAT: NORMAL
SARS-COV-2, NAA: NOT DETECTED